# Patient Record
Sex: FEMALE | Race: WHITE | NOT HISPANIC OR LATINO | Employment: UNEMPLOYED | URBAN - METROPOLITAN AREA
[De-identification: names, ages, dates, MRNs, and addresses within clinical notes are randomized per-mention and may not be internally consistent; named-entity substitution may affect disease eponyms.]

---

## 2023-04-17 DIAGNOSIS — M13.0 POLYARTHRITIS: Primary | ICD-10-CM

## 2023-04-17 RX ORDER — NAPROXEN 500 MG/1
500 TABLET ORAL 2 TIMES DAILY PRN
Qty: 180 TABLET | Refills: 3 | Status: SHIPPED | OUTPATIENT
Start: 2023-04-17 | End: 2024-04-16

## 2023-05-30 ENCOUNTER — OFFICE VISIT (OUTPATIENT)
Dept: PRIMARY CARE | Facility: CLINIC | Age: 53
End: 2023-05-30
Payer: COMMERCIAL

## 2023-05-30 VITALS
BODY MASS INDEX: 41.45 KG/M2 | WEIGHT: 248.8 LBS | SYSTOLIC BLOOD PRESSURE: 128 MMHG | HEIGHT: 65 IN | HEART RATE: 66 BPM | OXYGEN SATURATION: 95 % | DIASTOLIC BLOOD PRESSURE: 78 MMHG

## 2023-05-30 DIAGNOSIS — E11.9 CONTROLLED TYPE 2 DIABETES MELLITUS WITHOUT COMPLICATION, WITHOUT LONG-TERM CURRENT USE OF INSULIN (MULTI): Primary | ICD-10-CM

## 2023-05-30 DIAGNOSIS — K21.9 GASTROESOPHAGEAL REFLUX DISEASE WITHOUT ESOPHAGITIS: ICD-10-CM

## 2023-05-30 DIAGNOSIS — B02.29 POST HERPETIC NEURALGIA: ICD-10-CM

## 2023-05-30 PROCEDURE — 3074F SYST BP LT 130 MM HG: CPT | Performed by: FAMILY MEDICINE

## 2023-05-30 PROCEDURE — 3078F DIAST BP <80 MM HG: CPT | Performed by: FAMILY MEDICINE

## 2023-05-30 PROCEDURE — 90750 HZV VACC RECOMBINANT IM: CPT | Performed by: FAMILY MEDICINE

## 2023-05-30 PROCEDURE — 3008F BODY MASS INDEX DOCD: CPT | Performed by: FAMILY MEDICINE

## 2023-05-30 PROCEDURE — 99214 OFFICE O/P EST MOD 30 MIN: CPT | Performed by: FAMILY MEDICINE

## 2023-05-30 PROCEDURE — 1036F TOBACCO NON-USER: CPT | Performed by: FAMILY MEDICINE

## 2023-05-30 PROCEDURE — 90471 IMMUNIZATION ADMIN: CPT | Performed by: FAMILY MEDICINE

## 2023-05-30 RX ORDER — CLONAZEPAM 1 MG/1
TABLET ORAL 2 TIMES DAILY
COMMUNITY

## 2023-05-30 RX ORDER — METFORMIN HYDROCHLORIDE 500 MG/1
TABLET ORAL
COMMUNITY
Start: 2021-08-19

## 2023-05-30 RX ORDER — VORTIOXETINE 20 MG/1
1 TABLET, FILM COATED ORAL DAILY
COMMUNITY
Start: 2021-01-14

## 2023-05-30 RX ORDER — LAMOTRIGINE 150 MG/1
TABLET ORAL
COMMUNITY
Start: 2021-06-14 | End: 2023-10-03

## 2023-05-30 RX ORDER — SEMAGLUTIDE 2.68 MG/ML
2 INJECTION, SOLUTION SUBCUTANEOUS
Qty: 9 ML | Refills: 3 | Status: SHIPPED | OUTPATIENT
Start: 2023-05-30 | End: 2024-05-29

## 2023-05-30 RX ORDER — NAPROXEN SODIUM 500 MG/1
TABLET, FILM COATED, EXTENDED RELEASE ORAL
COMMUNITY
Start: 2021-01-14 | End: 2023-10-03

## 2023-05-30 RX ORDER — LIOTHYRONINE SODIUM 5 UG/1
1 TABLET ORAL DAILY
COMMUNITY

## 2023-05-30 RX ORDER — LEVOTHYROXINE SODIUM 50 UG/1
TABLET ORAL
COMMUNITY

## 2023-05-30 RX ORDER — HYDROXYZINE HYDROCHLORIDE 25 MG/1
TABLET, FILM COATED ORAL
COMMUNITY
Start: 2023-01-30

## 2023-05-30 RX ORDER — MULTIVITAMIN
1 TABLET ORAL DAILY
COMMUNITY

## 2023-05-30 RX ORDER — PREGABALIN 150 MG/1
CAPSULE ORAL
COMMUNITY
Start: 2016-04-26 | End: 2023-08-09 | Stop reason: SDUPTHER

## 2023-05-30 RX ORDER — OMEPRAZOLE 20 MG/1
20 TABLET, DELAYED RELEASE ORAL
Qty: 90 TABLET | Refills: 3 | Status: SHIPPED | OUTPATIENT
Start: 2023-05-30 | End: 2023-06-13

## 2023-05-30 RX ORDER — SEMAGLUTIDE 2.68 MG/ML
INJECTION, SOLUTION SUBCUTANEOUS
COMMUNITY
Start: 2022-09-12 | End: 2023-05-30 | Stop reason: SDUPTHER

## 2023-05-30 RX ORDER — ROSUVASTATIN CALCIUM 10 MG/1
10 TABLET, COATED ORAL DAILY
COMMUNITY

## 2023-05-30 RX ORDER — CHOLECALCIFEROL (VITAMIN D3) 25 MCG
1 TABLET ORAL DAILY
COMMUNITY

## 2023-05-30 RX ORDER — TOLTERODINE 4 MG/1
4 CAPSULE, EXTENDED RELEASE ORAL NIGHTLY
COMMUNITY
Start: 2022-10-20 | End: 2023-10-03

## 2023-05-30 RX ORDER — OMEPRAZOLE 20 MG/1
20 TABLET, DELAYED RELEASE ORAL
COMMUNITY
End: 2023-05-30 | Stop reason: SDUPTHER

## 2023-05-30 ASSESSMENT — PAIN SCALES - GENERAL: PAINLEVEL: 0-NO PAIN

## 2023-05-30 NOTE — PROGRESS NOTES
"Subjective   Patient ID: Mary Casillas is a 53 y.o. female who presents for Follow-up (4 month follow up. Review meds. ).    HPI   Patient here for 4-month follow-up.  Lyrica is working well for her postherpetic neuralgia  Patient has gained weight over the last 5 to 6 months.  She feels this is due to her Ozempic being stopped.  Endocrinology apparently did not want to keep her on this.  She would like to go back on.  She was not having any side effects and it definitely helped her maintain her appetite and keep her weight down.  Gyn appt is next week.  Review of Systems    Objective   /78 (BP Location: Left arm, Patient Position: Sitting, BP Cuff Size: Large adult)   Pulse 66   Ht 1.651 m (5' 5\")   Wt 113 kg (248 lb 12.8 oz)   SpO2 95%   BMI 41.40 kg/m²     Physical Exam  Alert, pleasant and in no acute distress.  Neck: Supple, no JVD or carotid bruit  Heart: Regular rate and rhythm, no murmur  Lungs: Clear to auscultation  Lower extremities: No edema    Assessment/Plan   Problem List Items Addressed This Visit    None  Visit Diagnoses       Controlled type 2 diabetes mellitus without complication, without long-term current use of insulin (CMS/[facility])    -  Primary    Relevant Medications    Ozempic 2 mg/dose (8 mg/3 mL) pen injector    Gastroesophageal reflux disease without esophagitis        Relevant Medications    omeprazole OTC (PriLOSEC OTC) 20 mg EC tablet    Post herpetic neuralgia              Patient here for checkup.  We will restart her Ozempic to help with her diabetes as well as her weight.  Omeprazole refilled for chronic GERD.  Patient to stay on Lyrica for postherpetic neuralgia.  Shingles vaccine provided.  Follow-up in 3 to 4 months for second shingles vaccine and routine checkup.     "

## 2023-06-13 DIAGNOSIS — K21.9 GASTROESOPHAGEAL REFLUX DISEASE WITHOUT ESOPHAGITIS: Primary | ICD-10-CM

## 2023-06-13 RX ORDER — OMEPRAZOLE 20 MG/1
20 CAPSULE, DELAYED RELEASE ORAL
Qty: 90 CAPSULE | Refills: 3 | Status: SHIPPED | OUTPATIENT
Start: 2023-06-13 | End: 2024-06-12

## 2023-06-20 ENCOUNTER — APPOINTMENT (OUTPATIENT)
Dept: PRIMARY CARE | Facility: CLINIC | Age: 53
End: 2023-06-20
Payer: COMMERCIAL

## 2023-06-23 ENCOUNTER — TELEPHONE (OUTPATIENT)
Dept: PRIMARY CARE | Facility: CLINIC | Age: 53
End: 2023-06-23
Payer: COMMERCIAL

## 2023-06-23 NOTE — TELEPHONE ENCOUNTER
----- Message from Bart Chapman DO sent at 2023  1:57 PM EDT -----  Regarding: FW: Authorization for Ozempic   Contact: [phone]    ----- Message -----  From: Shauna Sumner MA  Sent: 2023   1:55 PM EDT  To: Bart Chapman DO  Subject: FW: Authorization for Ozempic                      ----- Message -----  From: Mary Casillas  Sent: 2023   1:12 PM EDT  To:  [id number] [place] [id number] Clinical Support Staff  Subject: Authorization for Ozempic                        Hi!  Please complete the prior authorization form for my Ozempic prescription with [employer]. I called them; and they said that they have not received it from Dr Arango. Thank you for your time! Have a blessed day!  In Connie Wolfe   [birth year]

## 2023-06-23 NOTE — TELEPHONE ENCOUNTER
----- Message from Cheryl Mireles sent at 2023 11:37 AM EDT -----  Regarding: FW: Authorization for Ozempic   Contact: [phone]    ----- Message -----  From: Mary Casillas  Sent: 2023  11:32 AM EDT  To: [id number] [place] [id number] Clinical Support Staff  Subject: Authorization for Ozempic                        Hi!  I attached pictures of the denial letter from [employer]. I hope this is what you needed.   Thank you!  In AidenConnie   :3-71

## 2023-06-27 ENCOUNTER — APPOINTMENT (OUTPATIENT)
Dept: PRIMARY CARE | Facility: CLINIC | Age: 53
End: 2023-06-27
Payer: COMMERCIAL

## 2023-08-09 DIAGNOSIS — B02.29 POST HERPETIC NEURALGIA: Primary | ICD-10-CM

## 2023-08-09 RX ORDER — PREGABALIN 150 MG/1
150 CAPSULE ORAL 3 TIMES DAILY
Qty: 270 CAPSULE | Refills: 1 | Status: SHIPPED | OUTPATIENT
Start: 2023-08-09 | End: 2024-02-19 | Stop reason: SDUPTHER

## 2023-08-17 ENCOUNTER — APPOINTMENT (OUTPATIENT)
Dept: PRIMARY CARE | Facility: CLINIC | Age: 53
End: 2023-08-17
Payer: COMMERCIAL

## 2023-08-24 ENCOUNTER — APPOINTMENT (OUTPATIENT)
Dept: PRIMARY CARE | Facility: CLINIC | Age: 53
End: 2023-08-24
Payer: COMMERCIAL

## 2023-09-14 ENCOUNTER — TELEPHONE (OUTPATIENT)
Dept: PRIMARY CARE | Facility: CLINIC | Age: 53
End: 2023-09-14
Payer: COMMERCIAL

## 2023-10-03 ENCOUNTER — OFFICE VISIT (OUTPATIENT)
Dept: PRIMARY CARE | Facility: CLINIC | Age: 53
End: 2023-10-03
Payer: COMMERCIAL

## 2023-10-03 VITALS
SYSTOLIC BLOOD PRESSURE: 124 MMHG | HEIGHT: 65 IN | BODY MASS INDEX: 40.15 KG/M2 | OXYGEN SATURATION: 95 % | HEART RATE: 76 BPM | DIASTOLIC BLOOD PRESSURE: 72 MMHG | WEIGHT: 241 LBS

## 2023-10-03 DIAGNOSIS — G57.90 NEURITIS OF LOWER EXTREMITY, UNSPECIFIED LATERALITY: Primary | ICD-10-CM

## 2023-10-03 PROCEDURE — 90750 HZV VACC RECOMBINANT IM: CPT | Performed by: FAMILY MEDICINE

## 2023-10-03 PROCEDURE — 90471 IMMUNIZATION ADMIN: CPT | Performed by: FAMILY MEDICINE

## 2023-10-03 PROCEDURE — 90686 IIV4 VACC NO PRSV 0.5 ML IM: CPT | Performed by: FAMILY MEDICINE

## 2023-10-03 PROCEDURE — 99214 OFFICE O/P EST MOD 30 MIN: CPT | Performed by: FAMILY MEDICINE

## 2023-10-03 PROCEDURE — 3008F BODY MASS INDEX DOCD: CPT | Performed by: FAMILY MEDICINE

## 2023-10-03 PROCEDURE — 1036F TOBACCO NON-USER: CPT | Performed by: FAMILY MEDICINE

## 2023-10-03 PROCEDURE — 90472 IMMUNIZATION ADMIN EACH ADD: CPT | Performed by: FAMILY MEDICINE

## 2023-10-03 RX ORDER — MIRABEGRON 50 MG/1
TABLET, FILM COATED, EXTENDED RELEASE ORAL
COMMUNITY
Start: 2023-07-18

## 2023-10-03 RX ORDER — FERROUS SULFATE, DRIED 160(50) MG
TABLET, EXTENDED RELEASE ORAL
COMMUNITY
Start: 2008-08-07

## 2023-10-03 RX ORDER — ESTRADIOL 0.1 MG/D
FILM, EXTENDED RELEASE TRANSDERMAL
COMMUNITY
Start: 2023-09-18

## 2023-10-03 RX ORDER — LAMOTRIGINE 100 MG/1
TABLET ORAL
COMMUNITY
Start: 2023-10-02

## 2023-10-03 ASSESSMENT — PAIN SCALES - GENERAL: PAINLEVEL: 2

## 2023-10-03 ASSESSMENT — ENCOUNTER SYMPTOMS: DEPRESSION: 1

## 2023-10-03 NOTE — PROGRESS NOTES
"Subjective   Patient ID: Mary Casillas is a 53 y.o. female who presents for Follow-up (Follow up review mediations. ) and THIGH (Thigh pain and concerns. ).    HPI   Moving to [place]  A1c less than 6 mos ago by endocrinology  Patient complains of thigh pain.  She describes it as a burning and tingling on her anterior thigh.  It is bilateral.  It occurs with standing or walking.  It resolves with rest.  Review of Systems    Objective   /72 (BP Location: Left arm, Patient Position: Sitting, BP Cuff Size: Large adult)   Pulse 76   Ht 1.651 m (5' 5\")   Wt 109 kg (241 lb)   SpO2 95%   BMI 40.10 kg/m²     Physical Exam  Alert, pleasant and in no acute distress.  Heart: Regular rate and rhythm without murmur  Lungs: Clear to auscultation  Lower extremities: No edema                              Full hip range of motion.  Normal appearing.  No swelling or deformity noted.  Assessment/Plan   Problem List Items Addressed This Visit    None  Visit Diagnoses         Codes    Neuritis of lower extremity, unspecified laterality    -  Primary G57.90        Patient here with what sounds like paresthetica neuralgia.  It is in both eyes.  It does come and go which is sort of unique.  Advised patient that she may need to get an EMG to define things further.  Otherwise for now, since she has no weakness or debility, she can have this checked when she gets to [place].  Medications reviewed with the patient.  Discussed the importance of follow-up when she is in [place].  Flu and shingles vaccines provided       "

## 2023-12-01 DIAGNOSIS — E11.9 CONTROLLED TYPE 2 DIABETES MELLITUS WITHOUT COMPLICATION, WITHOUT LONG-TERM CURRENT USE OF INSULIN (MULTI): Primary | ICD-10-CM

## 2023-12-11 DIAGNOSIS — U07.1 COVID-19: ICD-10-CM

## 2023-12-11 DIAGNOSIS — U07.1 COVID: Primary | ICD-10-CM

## 2023-12-11 RX ORDER — NIRMATRELVIR AND RITONAVIR 300-100 MG
3 KIT ORAL 2 TIMES DAILY
Qty: 30 TABLET | Refills: 0 | Status: SHIPPED | OUTPATIENT
Start: 2023-12-11 | End: 2023-12-16

## 2024-02-19 DIAGNOSIS — B02.29 POST HERPETIC NEURALGIA: ICD-10-CM

## 2024-02-19 RX ORDER — PREGABALIN 150 MG/1
150 CAPSULE ORAL 3 TIMES DAILY
Qty: 270 CAPSULE | Refills: 0 | Status: SHIPPED | OUTPATIENT
Start: 2024-02-19

## 2024-07-17 DIAGNOSIS — E11.9 CONTROLLED TYPE 2 DIABETES MELLITUS WITHOUT COMPLICATION, WITHOUT LONG-TERM CURRENT USE OF INSULIN (MULTI): ICD-10-CM

## 2024-07-18 RX ORDER — SEMAGLUTIDE 2.68 MG/ML
2 INJECTION, SOLUTION SUBCUTANEOUS
Qty: 9 ML | Refills: 0 | Status: SHIPPED | OUTPATIENT
Start: 2024-07-21